# Patient Record
(demographics unavailable — no encounter records)

---

## 2025-04-16 NOTE — HISTORY OF PRESENT ILLNESS
[FreeTextEntry1] : Patient is a 27-year-old with chief complaint of UTI symptoms which began February 2025.  Patient reports symptoms came on gradually.  She was started on Macrobid by an urgent care despite negative urine culture.  She states that urinary symptoms including frequency urgency and bladder pressure gradually improved.  She reports continue having urinary frequency.  She has history of bacterial vaginosis.  Has not been evaluated by GYN recently.  Denies any dysuria and gross hematuria.  Works in an infusion center.  Drinks 1 cup of coffee daily.

## 2025-04-16 NOTE — ASSESSMENT
[FreeTextEntry1] : Lower urinary tract symptoms including bladder pressure which came on gradually and improve gradually. Negative urine culture.  Plan - UA U culture today - Hemoglobin A1c to evaluate blood sugars - Kidney bladder sonogram and same-day review.

## 2025-04-16 NOTE — END OF VISIT
[FreeTextEntry3] :  I obtained history/physical, formulated a treatment plan which I discussed with the patient and agree with the above transcription by the physicians assistant.